# Patient Record
Sex: FEMALE | Race: OTHER | Employment: OTHER | URBAN - METROPOLITAN AREA
[De-identification: names, ages, dates, MRNs, and addresses within clinical notes are randomized per-mention and may not be internally consistent; named-entity substitution may affect disease eponyms.]

---

## 2018-04-26 ENCOUNTER — HOSPITAL ENCOUNTER (OUTPATIENT)
Dept: MAMMOGRAPHY | Age: 63
Discharge: HOME | End: 2018-04-28
Payer: COMMERCIAL

## 2018-04-26 DIAGNOSIS — Z12.31 VISIT FOR SCREENING MAMMOGRAM: ICD-10-CM

## 2018-04-26 PROCEDURE — 77067 SCR MAMMO BI INCL CAD: CPT

## 2018-08-10 ENCOUNTER — HOSPITAL ENCOUNTER (EMERGENCY)
Age: 63
Discharge: HOME | End: 2018-08-10
Payer: COMMERCIAL

## 2018-08-10 VITALS
WEIGHT: 187 LBS | HEIGHT: 60 IN | SYSTOLIC BLOOD PRESSURE: 135 MMHG | TEMPERATURE: 98.6 F | DIASTOLIC BLOOD PRESSURE: 62 MMHG | HEART RATE: 78 BPM | OXYGEN SATURATION: 96 % | RESPIRATION RATE: 16 BRPM | BODY MASS INDEX: 36.71 KG/M2

## 2018-08-10 DIAGNOSIS — M54.31 SCIATICA OF RIGHT SIDE: Primary | ICD-10-CM

## 2018-08-10 PROCEDURE — 6360000002 HC RX W HCPCS: Performed by: PHYSICIAN ASSISTANT

## 2018-08-10 PROCEDURE — 96372 THER/PROPH/DIAG INJ SC/IM: CPT

## 2018-08-10 PROCEDURE — 99283 EMERGENCY DEPT VISIT LOW MDM: CPT

## 2018-08-10 RX ORDER — DEXAMETHASONE SODIUM PHOSPHATE 10 MG/ML
10 INJECTION INTRAMUSCULAR; INTRAVENOUS ONCE
Status: COMPLETED | OUTPATIENT
Start: 2018-08-10 | End: 2018-08-10

## 2018-08-10 RX ORDER — KETOROLAC TROMETHAMINE 30 MG/ML
60 INJECTION, SOLUTION INTRAMUSCULAR; INTRAVENOUS ONCE
Status: COMPLETED | OUTPATIENT
Start: 2018-08-10 | End: 2018-08-10

## 2018-08-10 RX ORDER — PREDNISONE 20 MG/1
60 TABLET ORAL DAILY
Qty: 15 TABLET | Refills: 0 | Status: SHIPPED | OUTPATIENT
Start: 2018-08-10 | End: 2018-08-15

## 2018-08-10 RX ORDER — CYCLOBENZAPRINE HCL 5 MG
5 TABLET ORAL 3 TIMES DAILY PRN
Qty: 15 TABLET | Refills: 0 | Status: SHIPPED | OUTPATIENT
Start: 2018-08-10 | End: 2018-08-20

## 2018-08-10 RX ADMIN — KETOROLAC TROMETHAMINE 60 MG: 30 INJECTION, SOLUTION INTRAMUSCULAR at 11:20

## 2018-08-10 RX ADMIN — DEXAMETHASONE SODIUM PHOSPHATE 10 MG: 10 INJECTION INTRAMUSCULAR; INTRAVENOUS at 11:20

## 2018-08-10 ASSESSMENT — PAIN DESCRIPTION - FREQUENCY: FREQUENCY: CONTINUOUS

## 2018-08-10 ASSESSMENT — PAIN DESCRIPTION - ORIENTATION: ORIENTATION: LOWER

## 2018-08-10 ASSESSMENT — PAIN SCALES - GENERAL
PAINLEVEL_OUTOF10: 10
PAINLEVEL_OUTOF10: 8
PAINLEVEL_OUTOF10: 10

## 2018-08-10 ASSESSMENT — PAIN DESCRIPTION - PAIN TYPE: TYPE: ACUTE PAIN

## 2018-08-10 ASSESSMENT — PAIN DESCRIPTION - PROGRESSION: CLINICAL_PROGRESSION: GRADUALLY WORSENING

## 2018-08-10 ASSESSMENT — PAIN DESCRIPTION - LOCATION: LOCATION: BACK

## 2018-08-16 ENCOUNTER — HOSPITAL ENCOUNTER (EMERGENCY)
Age: 63
Discharge: HOME | End: 2018-08-16
Payer: COMMERCIAL

## 2018-08-16 ENCOUNTER — APPOINTMENT (OUTPATIENT)
Dept: GENERAL RADIOLOGY | Age: 63
End: 2018-08-16
Payer: COMMERCIAL

## 2018-08-16 VITALS
SYSTOLIC BLOOD PRESSURE: 103 MMHG | RESPIRATION RATE: 14 BRPM | BODY MASS INDEX: 37.11 KG/M2 | WEIGHT: 189 LBS | HEIGHT: 60 IN | HEART RATE: 96 BPM | TEMPERATURE: 98.9 F | OXYGEN SATURATION: 94 % | DIASTOLIC BLOOD PRESSURE: 60 MMHG

## 2018-08-16 DIAGNOSIS — M47.26 OSTEOARTHRITIS OF SPINE WITH RADICULOPATHY, LUMBAR REGION: Primary | ICD-10-CM

## 2018-08-16 PROCEDURE — 6360000002 HC RX W HCPCS

## 2018-08-16 PROCEDURE — 6360000002 HC RX W HCPCS: Performed by: PHYSICIAN ASSISTANT

## 2018-08-16 PROCEDURE — 96372 THER/PROPH/DIAG INJ SC/IM: CPT

## 2018-08-16 PROCEDURE — 72100 X-RAY EXAM L-S SPINE 2/3 VWS: CPT

## 2018-08-16 PROCEDURE — 99283 EMERGENCY DEPT VISIT LOW MDM: CPT

## 2018-08-16 RX ORDER — TRAMADOL HYDROCHLORIDE 50 MG/1
50 TABLET ORAL EVERY 6 HOURS PRN
Qty: 20 TABLET | Refills: 0 | Status: SHIPPED | OUTPATIENT
Start: 2018-08-16 | End: 2018-08-21

## 2018-08-16 RX ORDER — MORPHINE SULFATE 4 MG/ML
INJECTION, SOLUTION INTRAMUSCULAR; INTRAVENOUS
Status: COMPLETED
Start: 2018-08-16 | End: 2018-08-16

## 2018-08-16 RX ORDER — DEXAMETHASONE SODIUM PHOSPHATE 10 MG/ML
10 INJECTION INTRAMUSCULAR; INTRAVENOUS ONCE
Status: COMPLETED | OUTPATIENT
Start: 2018-08-16 | End: 2018-08-16

## 2018-08-16 RX ORDER — MORPHINE SULFATE 8 MG/ML
6 INJECTION, SOLUTION INTRAMUSCULAR; INTRAVENOUS ONCE
Status: DISCONTINUED | OUTPATIENT
Start: 2018-08-16 | End: 2018-08-16 | Stop reason: HOSPADM

## 2018-08-16 RX ADMIN — MORPHINE SULFATE 6 MG: 4 INJECTION, SOLUTION INTRAMUSCULAR; INTRAVENOUS at 13:56

## 2018-08-16 RX ADMIN — DEXAMETHASONE SODIUM PHOSPHATE 10 MG: 10 INJECTION INTRAMUSCULAR; INTRAVENOUS at 13:56

## 2018-08-16 ASSESSMENT — PAIN DESCRIPTION - ORIENTATION: ORIENTATION: LOWER

## 2018-08-16 ASSESSMENT — PAIN SCALES - GENERAL: PAINLEVEL_OUTOF10: 10

## 2018-08-16 ASSESSMENT — PAIN DESCRIPTION - LOCATION: LOCATION: BACK

## 2019-08-08 ENCOUNTER — HOSPITAL ENCOUNTER (OUTPATIENT)
Dept: GENERAL RADIOLOGY | Age: 64
Discharge: HOME | End: 2019-08-10
Payer: COMMERCIAL

## 2019-08-08 ENCOUNTER — HOSPITAL ENCOUNTER (OUTPATIENT)
Age: 64
Discharge: HOME | End: 2019-08-10
Payer: COMMERCIAL

## 2019-08-08 DIAGNOSIS — M25.561 RIGHT KNEE PAIN, UNSPECIFIED CHRONICITY: ICD-10-CM

## 2019-08-08 PROCEDURE — 73564 X-RAY EXAM KNEE 4 OR MORE: CPT

## 2022-05-26 ENCOUNTER — HOSPITAL ENCOUNTER (OUTPATIENT)
Dept: PHYSICAL THERAPY | Age: 67
Setting detail: THERAPIES SERIES
Discharge: HOME | End: 2022-05-26
Payer: COMMERCIAL

## 2022-05-26 PROCEDURE — 97161 PT EVAL LOW COMPLEX 20 MIN: CPT | Performed by: PHYSICAL THERAPIST

## 2022-05-26 ASSESSMENT — PAIN DESCRIPTION - LOCATION: LOCATION: BACK

## 2022-05-26 ASSESSMENT — PAIN DESCRIPTION - PAIN TYPE: TYPE: CHRONIC PAIN

## 2022-05-26 ASSESSMENT — PAIN SCALES - GENERAL: PAINLEVEL_OUTOF10: 8

## 2022-05-26 ASSESSMENT — PAIN DESCRIPTION - FREQUENCY: FREQUENCY: CONTINUOUS

## 2022-05-26 ASSESSMENT — PAIN DESCRIPTION - DESCRIPTORS: DESCRIPTORS: CRAMPING;SPASM;SHARP

## 2022-05-26 NOTE — PROGRESS NOTES
Physical Therapy    Physical Therapy: Initial Evaluation    Patient: Deni Browne (49 y.o. female)   Examination Date:   Plan of Care Certification Period: 2022 to        :  1955 ;    Confirmed: Yes MRN: [id number]  CSN: [id number]   Insurance: Payor: Luis A Le / Plan: Vinay Darby / Product Type: *No Product type* /   Insurance ID: [id number] - (Medicaid Managed) Secondary Insurance (if applicable):    Referring Physician: MD Stuart Pastor MD   PCP: Marguerite Cary MD Visits to Date/Visits Approved: 1 /      No Show/Cancelled Appts:   /       Medical Diagnosis: Repeated falls [R29.6]  Unsteadiness on feet [R26.81] R29.6 (ICD-10-CM) - Repeated shtsxY46.81 (ICD-10-CM) - Unsteadiness on feet  Treatment Diagnosis:       PERTINENT MEDICAL HISTORY           Medical History: Chart Reviewed: Yes   Past Medical History:   Diagnosis Date    Asthma     Bipolar 1 disorder ([facility] [address]     Carpal tunnel syndrome     Chronic back pain     Cyst of joint of ankle or foot     L-ankle    Cyst, dermoid, leg     Fibromyalgia     Lumbar disc herniation     Lumbar stenosis     Lung abnormality     spot  on L-lung    Numbness     Osteoarthritis     Stomach ulcer      Surgical History:   Past Surgical History:   Procedure Laterality Date    ANKLE SURGERY      left    CARPAL TUNNEL RELEASE  5010    LT, Dr. Aniket Preston ARTHROSCOPY  12    OTHER SURGICAL HISTORY  2012    face surgery       Medications:   Current Outpatient Medications:     omeprazole (PRILOSEC) 20 MG delayed release capsule, , Disp: , Rfl:     CHANTIX CONTINUING MONTH STEVIE 1 MG tablet, , Disp: , Rfl:     levothyroxine (SYNTHROID) 25 MCG tablet, , Disp: , Rfl:     vitamin D (ERGOCALCIFEROL) 20295 units CAPS capsule, , Disp: , Rfl:     bumetanide (BUMEX) 1 MG tablet, , Disp: , Rfl:     HYDROcodone-acetaminophen (NORCO) 5-325 MG per tablet, , Disp: , Rfl:     zolpidem (AMBIEN) 10 MG tablet, Take 10 mg by mouth nightly as needed for Sleep, Disp: , Rfl:     ALPRAZolam (XANAX) 1 MG tablet, Take 2 mg by mouth 3 times daily as needed, Disp: , Rfl:     QUEtiapine (SEROQUEL) 300 MG tablet, Take 600 mg by mouth nightly, Disp: , Rfl:     gabapentin (NEURONTIN) 800 MG tablet, Take 0.5 tablets by mouth 2 times daily. (Patient taking differently:  Take 800 mg by mouth 3 times daily ), Disp: 90 tablet, Rfl: 3    aspirin 81 MG chewable tablet,  Take 81 mg by mouth daily , Disp: , Rfl:     albuterol (PROVENTIL HFA;VENTOLIN HFA) 108 (90 BASE) MCG/ACT inhaler, Inhale 2 puffs into the lungs every 6 hours as needed. , Disp: , Rfl:   Allergies: Lyrica [pregabalin] and Penicillins      SUBJECTIVE EXAMINATION      ,           Subjective History:    Subjective: Patient presents to PT with c/o repeated falls. Falls have been present for approx 1 year. States symptoms have been progressing. Patient also reports having back pain with radicular symptoms across LEs. She reports buckling of knees occasionally. Dizziness occurs with bending motions. She has generalized fatigue resulting in limited ADL functions per pt. Patient takes norco for pain relief BID, gabapenin TID.   Additional Pertinent Hx (if applicable):            Learning/Language: Learning  Does the patient/guardian have any barriers to learning?: No barriers  What is the preferred language of the patient/guardian?: English     Pain Screening    Pain Screening  Patient Currently in Pain: Yes  Pain Assessment: 0-10  Pain Level: 8  Best Pain Level: 8  Worst Pain Level: 10  Pain Type: Chronic pain  Pain Location: Back  Pain Descriptors: Cramping,Spasm,Sharp  Pain Frequency: Continuous    Functional Status    Dominant Hand: : Left    Social History:    Social History  Lives With: Spouse  Type of Home: Apartment  Home Layout: One level  Home Access: Stairs to enter with rails  Entrance Stairs - Rails: Both  Entrance Stairs - Number of Steps: 4  Bathroom Shower/Tub: Tub/Shower unit,Walk-in shower  Bathroom Toilet: Handicap height  Bathroom Accessibility: Accessible    OBJECTIVE EXAMINATION   Restrictions:     None    Review of Systems:  Follows Commands: Within Functional Limits    Vestibular:   Eye movements: no notable nystagmus  Head/trunk movements: no notable nystagmus     Palpation:   Lumbar Spine Palpation: pain noted across central LS region and throughout R LS region    Ambulation/Gait (if applicable):   Ambulation  Surface: level tile  Device: No Device  Assistance: Independent  Quality of Gait: pt ambulates with wide DIANE; flat foot contact with significant med/lat trunk deviation  Gait Deviations: Slow Evelyn,Increased DIANE,Decreased step length,Decreased step height,Deviated path    Balance Screen:   Balance  Posture: Fair (fwd head/rounded shoulder and trunk posturing)  Sitting - Static: Good  Sitting - Dynamic: Good  Standing - Static: Fair  Standing - Dynamic: Fair    Left AROM  Right AROM       WFL     WFL        Left Strength  Right Strength         Strength LLE  L Hip Flexion: 4+/5  L Hip Extension: 4+/5  L Hip ABduction: 4+/5  L Hip ADduction: 4+/5  L Knee Flexion: 4+/5  L Knee Extension: 4+/5    Strength RLE  R Hip Flexion: 4-/5  R Hip Extension: 4-/5  R Hip ABduction: 4-/5  R Hip ADduction: 4-/5  R Knee Flexion: 4+/5  R Knee Extension: 4+/5     Cervical Assessment     AROM Cervical Spine   Cervical Spine AROM : WFL         Lumbar Assessment     AROM Lumbar Spine   Lumbar spine general AROM: limited 50% all planes       Trunk Strength     Trunk Strength  Trunk Flexion: 4-/5  Trunk Extension: 4-/5     Special Tests:        Balance/Gait Assessment(s) Performed:   Tinetti Balance    Sitting Balance: Steady, safe  Arises: Able without using arms  Attempts to Arise: Able to arise, one attempt  Immediate Standing Balance (First 5 Seconds):  Unsteady (swaggers, moves feet, trunk sway)  Standing Balance: Steady but wide stance, uses cane or other support  Nudged: Staggers, grabs, catches self  Eyes Closed: Unsteady  Turned 360 Degrees: Steadiness: Steady  Turned 360 Degrees: Continuity of Steps: Continuous  Sitting Down: Safe, smooth motion  Balance Score: 11/16 Initiation of Gait: No hesitancy  Step Height: R Swing Foot: Right foot complete clears floor  Step Length: R Swing Foot: Passes left stance foot  Step Height: L Swing Foot: Left foot complete clears floor  Step Length: L Swing Foot: Passes right stance foot  Step Symmetry: Right and left step appear equal  Step Continuity: Steps appear continuous  Path: Mild/moderate deviation or uses walking aid  Trunk: Marked sway or uses walking aid  Walking Time: Heels apart  Gait Score: 8/12     Current Score: 19 / 28 (Date: 5/26/2022)    Interpretation of Score: Tinetti is  into a gait score and balance score. The higher the patient's score, the more independent/lower fall risk. A total score of 27 or more indicates low fall risk, 20-26 is moderate fall risk, and 19 or less is indicative of high fall risk. ASSESSMENT     Impression: Assessment: Patient presents to PT with reports of frequent falls; Limited balance noted at 19/28 Tinetti (high risk of falls); Pt has increased back pain with radicular symptoms across BLEs; Limited strength/ROM across trunk with hindered posturing    Body Structures, Functions, Activity Limitations Requiring Skilled Therapeutic Intervention: Decreased functional mobility ,Decreased ROM,Decreased strength,Decreased balance,Decreased posture    Statement of Medical Necessity: Physical Therapy is both indicated and medically necessary as outlined in the POC to increase the likelihood of meeting the functionally related goals stated below.      Patient's Activity Tolerance:        Patient's rehabilitation potential/prognosis is considered to be: Fair,Good    Factors which may impact rehabilitation potential include:          GOALS   Patient Goal(s):    Short Term Goals Completed by 3 weeks Goal Status   increase AROM of trunk to within 75% functional limits all planes     increase strength of trunk/R hip to grossly 4/5 improving upright posture to FAIR+     increase balance to > 20/28 Tinetti improving functional gait/pt safety     decrease pain to < 5/10 across back/BLEs with activity               Long Term Goals Completed by 6 weeks Goal Status   increase AROM of trunk to within 90% functional limits all planes     increase strength of trunk/R hip to grossly 4+/5 improving upright posture to GOOD-     increase balance to > 22/28 Tinetti improving functional gait/pt safety     decrease pain to < 3/10 across back/BLEs with activity     pt demonstrates independence with HEP          TREATMENT PLAN       Requires PT Follow-Up: Yes    Pt. actively involved in establishing Plan of Care and Goals: Yes  Patient/ Caregiver education and instruction:      Pt instructed to use FWW at home especially when feeling dizzy, unsteady, or when having generalized fatigue- pt and pt  verbalized understanding         Treatment may include any combination of the following: Strengthening,ROM,Balance training,Gait training,Neuromuscular re-education,Home exercise program,Safety education & training,Patient/Caregiver education & training,Equipment evaluation, education, & procurement,Vestibular rehab     Frequency / Duration:  Patient to be seen 2 for 6 weeks      Eval Complexity:    Decision Making: Low Complexity    PT Treatment Completed:  N/A - Evaluation Only    Therapy Time  Individual Time In: 1335       Individual Time Out: 19643 68 71 79  Minutes: 40        Therapist Signature: Itzel Faust, PT    Date: 1/31/8842     I certify that the above Therapy Services are being furnished while the patient is under my care. I agree with the treatment plan and certify that this therapy is necessary.       [de-identified] Signature:  ___________________________   Date:_______ Kirsten Nice MD        Physician Comments: _______________________________________________    Please sign and return to [facility]. Please fax to the location listed below.  Juan Grijalva for this referral!    [address] [facility]  [address] [id number]  Dept: [address]: [phone]       POC NOTE

## 2022-06-28 ENCOUNTER — HOSPITAL ENCOUNTER (OUTPATIENT)
Dept: PHYSICAL THERAPY | Age: 67
Setting detail: THERAPIES SERIES
End: 2022-06-28
Payer: COMMERCIAL

## 2022-06-28 NOTE — PROGRESS NOTES
[facility]  Phone: [phone] Fax: [phone]     Physical Therapy  Cancellation/No-show Note  Patient Name:  Sarika Austin  :  1955   Date:  2022    For today's appointment patient:  [x]  Cancelled  []  Rescheduled appointment  []  No-show     Reason given by patient:  []  Patient ill  [x]  Conflicting appointment  []  No transportation    []  Conflict with work  []  No reason given  []  Other:     Comments:      Electronically signed by:  Jaison Alberto PT

## 2022-06-30 ENCOUNTER — HOSPITAL ENCOUNTER (OUTPATIENT)
Dept: PHYSICAL THERAPY | Age: 67
Setting detail: THERAPIES SERIES
Discharge: HOME | End: 2022-06-30
Payer: COMMERCIAL

## 2022-06-30 PROCEDURE — 97110 THERAPEUTIC EXERCISES: CPT | Performed by: PHYSICAL THERAPIST

## 2022-06-30 PROCEDURE — 97530 THERAPEUTIC ACTIVITIES: CPT | Performed by: PHYSICAL THERAPIST

## 2022-06-30 NOTE — PROGRESS NOTES
[facility]  Phone: [phone] Fax: [phone]       Physical Therapy Daily Treatment Note  Date:  2022    Patient Name:  Mao Ravi    :  1955  MRN: [id number]    Patient: Mao Ravi (56 y.o. female)   Examination Date:   Plan of Care Certification Period: 2022 to       :  1955 ;    Confirmed: Yes MRN: [id number]  CSN: [id number]   Insurance: Payor: OrderBorderml Asp / Plan: Marradhaa Silke / Product Type: *No Product type* /   Insurance ID: [id number] - (Medicaid Managed) Secondary Insurance (if applicable):    Referring Physician: MD Annmarie De La Fuente MD   PCP: Eve Monreal MD Visits to Date/Visits Approved: 2/      No Show/Cancelled Appts:   /       Medical Diagnosis: Repeated falls [R29.6]  Unsteadiness on feet [R26.81] R29.6 (ICD-10-CM) - Repeated [id number] (ICD-10-CM) - Unsteadiness on feet  Treatment Diagnosis:         Time In: 1035       Time Out:     1130     Subjective:  Pt reports cont to have back tightness and difficulty with stairs     Exercises:  Exercise/Equipment Resistance/Repetitions Other comments   tball flex           rot 10x10s  10x10s ea               Sit to stand    3x10           Step ups   3x10ea 6in           Marching    3x10ea            Side ambulation    3x15ft ea             Bike     [id number]                                                               Other Therapeutic Activities:      Home Exercise Program:      Manual Treatments:      Modalities:      Timed Code Treatment Minutes: Total Treatment Minutes:      Treatment/Activity Tolerance:  [x] Patient tolerated treatment well [] Patient limited by fatigue  [] Patient limited by pain  [] Patient limited by other medical complications  [] Other: good tolerance to exercise. Mild fatigue reported however no LOB/falls with exercises.      Plan:   [x] Continue per plan of care [] Alter current plan (see comments)  [] Plan of care initiated []

## 2022-07-14 ENCOUNTER — HOSPITAL ENCOUNTER (OUTPATIENT)
Dept: PHYSICAL THERAPY | Age: 67
Setting detail: THERAPIES SERIES
Discharge: HOME | End: 2022-07-14
Payer: COMMERCIAL

## 2022-07-14 PROCEDURE — 97110 THERAPEUTIC EXERCISES: CPT | Performed by: PHYSICAL THERAPIST

## 2022-07-14 PROCEDURE — 97530 THERAPEUTIC ACTIVITIES: CPT | Performed by: PHYSICAL THERAPIST

## 2022-07-14 NOTE — PROGRESS NOTES
Continue per plan of care [] Alter current plan (see comments)  [] Plan of care initiated [] Hold pending MD visit [] Discharge  Plan for Next Session:         Treatment Charges: Mins Units   Initial Evaluation     Re-Evaluation     Ther Exercise         TE 30 2   Manual Therapy     MT     Ther Activities        TA 15 1   Gait Training          GT     Neuro Re-education NR     Modalities     Non-Billable Service Time     Other     Total Time/Units 45 3     Electronically signed by:  Jasmine Hubbard PT

## 2022-07-28 ENCOUNTER — HOSPITAL ENCOUNTER (OUTPATIENT)
Dept: PHYSICAL THERAPY | Age: 67
Setting detail: THERAPIES SERIES
Discharge: HOME | End: 2022-07-28
Payer: COMMERCIAL

## 2022-07-28 NOTE — PROGRESS NOTES
[facility]  Phone: [phone] Fax: [phone]     Physical Therapy  Cancellation/No-show Note  Patient Name:  Wilman Jarvis  :  1955   Date:  2022    For today's appointment patient:  [x]  Cancelled  []  Rescheduled appointment  []  No-show     Reason given by patient:  [x]  Patient ill  []  Conflicting appointment  []  No transportation    []  Conflict with work  []  No reason given  []  Other:     Comments:      Electronically signed by:  Aditi Manrique PT

## 2022-08-04 ENCOUNTER — HOSPITAL ENCOUNTER (OUTPATIENT)
Dept: AUDIOLOGY | Age: 67
Discharge: HOME | End: 2022-08-04

## 2022-08-04 PROCEDURE — 9990000010 HC NO CHARGE VISIT: Performed by: AUDIOLOGIST

## 2022-08-04 NOTE — PROGRESS NOTES
HEARING AID EVALUATION    Patient presents with audiometric testing for hearing aid evaluation/selection. After consultation, the patient would like to try Phonak Calebeo LR, black, medium power, 1 or 2 length, medium vented domes. The patient has [employer]. Medical necessity was obtained from Dr. Dilia Velarde and information will be submitted to insurance. Pending approval, hearing aids will be ordered. The patient wasnot scheduled for a hearing aid fitting at this time.     Electronically signed by Abdelrahman Burton on 8/4/2022 at 2:37 PM

## 2022-09-15 ENCOUNTER — HOSPITAL ENCOUNTER (OUTPATIENT)
Dept: AUDIOLOGY | Age: 67
Discharge: HOME | End: 2022-09-15

## 2022-09-15 PROCEDURE — 9990000010 HC NO CHARGE VISIT: Performed by: AUDIOLOGIST

## 2022-09-15 NOTE — PROGRESS NOTES
Fit with binaural  Phonak [id number] RITE /BTEhearing aids. Instructed in use and care. Gave  battery charger, warranty information and scheduled  30 day check for 10/11/22. Made following adjustments: none. Set to 90%, with automatic manager for 21 days. Hearing aid contract/battery warning form reviewed and signed. Hearing aids paired to phone davi per patient preference. She may opt to try at next visit. Patient was satisfied and will follow up on above date, unless problems arise. No charge visit today. Will bill at 30 day follow up per [facility] guidelines.      Electronically signed by Mckay Banegas on 9/15/2022 at 3:23 PM

## 2022-10-11 ENCOUNTER — HOSPITAL ENCOUNTER (OUTPATIENT)
Dept: AUDIOLOGY | Age: 67
Discharge: HOME | End: 2022-10-11
Payer: COMMERCIAL

## 2022-10-11 PROCEDURE — V5261 HEARING AID, DIGIT, BIN, BTE: HCPCS | Performed by: AUDIOLOGIST

## 2022-10-11 PROCEDURE — V5160 DISPENSING FEE BINAURAL: HCPCS | Performed by: AUDIOLOGIST

## 2022-10-11 NOTE — PROGRESS NOTES
HEARING AID FOLLOW UP       Patient hearing well; however she has several complaints. Hearing aid battery goes low after 6-7 hours and she has to put back in . HA glows yellow (battery anywhere from 11% to 80% charge)  Hears whistling when granddaughter has iPad nearby. Happened 1 time. Hearing is muffled after taking hearing aids out. Some sounds are too loud. Adjustments made:    Downloaded and paired davi (not streaming). She can check battery drain when she feels battery has gone dead and track to see if battery really is draining. Will monitor for any other unwanted noises. Suggest checking that hearing aid is fully inserted so not getting feedback. Explained may have perception of hearing loss after removing hearing aids. This is normal.   Hearing aid auto acclimatization turned off and set from 97% (auto since fitting) back to 90%. Explained how to use davi to adjust frequency response and use programs for background noise. Made a follow up appointment for 2 weeks. Billing done. BILLED BIN DIG BTE AND DISP FEE.      Sharry Boeck, M.A., [address] [facility] [id number]  Electronically signed by Bryon Hernandez on 10/11/2022 at 12:19 PM

## 2022-10-17 ENCOUNTER — FOLLOWUP TELEPHONE ENCOUNTER (OUTPATIENT)
Dept: AUDIOLOGY | Age: 67
End: 2022-10-17

## 2022-10-17 NOTE — TELEPHONE ENCOUNTER
Patient called in stating her left aid will not turn on: it does not flash any lights when in . Asked her to try to reset over phone, but it did not work. Asked her to bring in hearing and  on 10/13/22. Right aid charges in the left slot, no error codes noted. Reset hearing aid and hearing aid appears to be now working. Demonstrated to patient and  that hearing aid is now charging appropriately. Sounds good to her. Return as needed.      Ne Acosta M.A., [address] [facility] [id number]  Electronically signed by nAgela Bartlett on 10/17/2022 at 8:15 AM

## 2024-01-16 ENCOUNTER — FOLLOWUP TELEPHONE ENCOUNTER (OUTPATIENT)
Dept: AUDIOLOGY | Age: 69
End: 2024-01-16

## 2024-01-16 NOTE — TELEPHONE ENCOUNTER
Received message at [facility] stating she lost hearing aid and wants to know if she can get ITE. She now has [facility]. (Her old hearing aid is under warranty. Cannot get ITE, but can get custom mold).     Called back and left message. When she calls back, can explain above.     Electronically signed by Rhoda Young on 1/16/2024 at 10:06 AM

## 2025-01-14 ENCOUNTER — HOSPITAL ENCOUNTER (OUTPATIENT)
Age: 70
Discharge: HOME | End: 2025-01-14
Payer: MEDICARE

## 2025-01-14 LAB
ANION GAP SERPL CALCULATED.3IONS-SCNC: 11 MMOL/L (ref 7–16)
BASOPHILS # BLD: 0.04 K/UL (ref 0–0.2)
BASOPHILS NFR BLD: 1 % (ref 0–2)
BUN SERPL-MCNC: 16 MG/DL (ref 6–23)
CALCIUM SERPL-MCNC: 9.2 MG/DL (ref 8.6–10.2)
CHLORIDE SERPL-SCNC: 104 MMOL/L (ref 98–107)
CO2 SERPL-SCNC: 27 MMOL/L (ref 22–29)
CREAT SERPL-MCNC: 1 MG/DL (ref 0.5–1)
EOSINOPHIL # BLD: 0.1 K/UL (ref 0.05–0.5)
EOSINOPHILS RELATIVE PERCENT: 2 % (ref 0–6)
ERYTHROCYTE [DISTWIDTH] IN BLOOD BY AUTOMATED COUNT: 12.2 % (ref 11.5–15)
GFR, ESTIMATED: 62 ML/MIN/1.73M2
GLUCOSE SERPL-MCNC: 105 MG/DL (ref 74–99)
HBA1C MFR BLD: 5.5 % (ref 4–5.6)
HCT VFR BLD AUTO: 44 % (ref 34–48)
HGB BLD-MCNC: 13.8 G/DL (ref 11.5–15.5)
IMM GRANULOCYTES # BLD AUTO: <0.03 K/UL (ref 0–0.58)
IMM GRANULOCYTES NFR BLD: 0 % (ref 0–5)
INR PPP: 1.1
LYMPHOCYTES NFR BLD: 0.97 K/UL (ref 1.5–4)
LYMPHOCYTES RELATIVE PERCENT: 20 % (ref 20–42)
MCH RBC QN AUTO: 31.4 PG (ref 26–35)
MCHC RBC AUTO-ENTMCNC: 31.4 G/DL (ref 32–34.5)
MCV RBC AUTO: 100.2 FL (ref 80–99.9)
MONOCYTES NFR BLD: 0.55 K/UL (ref 0.1–0.95)
MONOCYTES NFR BLD: 11 % (ref 2–12)
NEUTROPHILS NFR BLD: 65 % (ref 43–80)
NEUTS SEG NFR BLD: 3.15 K/UL (ref 1.8–7.3)
PLATELET # BLD AUTO: 236 K/UL (ref 130–450)
PMV BLD AUTO: 10.6 FL (ref 7–12)
POTASSIUM SERPL-SCNC: 4.3 MMOL/L (ref 3.5–5)
PROTHROMBIN TIME: 11.6 SEC (ref 9.3–12.4)
RBC # BLD AUTO: 4.39 M/UL (ref 3.5–5.5)
SODIUM SERPL-SCNC: 142 MMOL/L (ref 132–146)
WBC OTHER # BLD: 4.8 K/UL (ref 4.5–11.5)

## 2025-01-14 PROCEDURE — 80048 BASIC METABOLIC PNL TOTAL CA: CPT

## 2025-01-14 PROCEDURE — 85610 PROTHROMBIN TIME: CPT

## 2025-01-14 PROCEDURE — 36415 COLL VENOUS BLD VENIPUNCTURE: CPT

## 2025-01-14 PROCEDURE — 83036 HEMOGLOBIN GLYCOSYLATED A1C: CPT

## 2025-01-14 PROCEDURE — 85025 COMPLETE CBC W/AUTO DIFF WBC: CPT

## 2025-02-05 ENCOUNTER — HOSPITAL ENCOUNTER (OUTPATIENT)
Age: 70
Discharge: HOME | End: 2025-02-07

## 2025-02-05 LAB
ABO + RH BLD: NORMAL
ARM BAND NUMBER: NORMAL
BLOOD BANK SAMPLE EXPIRATION: NORMAL
BLOOD GROUP ANTIBODIES SERPL: NEGATIVE

## 2025-02-05 PROCEDURE — 87081 CULTURE SCREEN ONLY: CPT

## 2025-02-05 PROCEDURE — 86900 BLOOD TYPING SEROLOGIC ABO: CPT

## 2025-02-05 PROCEDURE — 86850 RBC ANTIBODY SCREEN: CPT

## 2025-02-05 PROCEDURE — 86901 BLOOD TYPING SEROLOGIC RH(D): CPT

## 2025-02-07 LAB
MICROORGANISM SPEC CULT: NORMAL
SPECIMEN DESCRIPTION: NORMAL

## 2025-02-12 ENCOUNTER — HOSPITAL ENCOUNTER (OUTPATIENT)
Age: 70
Discharge: HOME | End: 2025-02-14

## 2025-02-12 LAB
ANION GAP SERPL CALCULATED.3IONS-SCNC: 11 MMOL/L (ref 7–16)
BUN SERPL-MCNC: 15 MG/DL (ref 6–23)
CALCIUM SERPL-MCNC: 9.1 MG/DL (ref 8.6–10.2)
CHLORIDE SERPL-SCNC: 101 MMOL/L (ref 98–107)
CO2 SERPL-SCNC: 28 MMOL/L (ref 22–29)
CREAT SERPL-MCNC: 0.8 MG/DL (ref 0.5–1)
ERYTHROCYTE [DISTWIDTH] IN BLOOD BY AUTOMATED COUNT: 12.4 % (ref 11.5–15)
GFR, ESTIMATED: 76 ML/MIN/1.73M2
GLUCOSE SERPL-MCNC: 125 MG/DL (ref 74–99)
HCT VFR BLD AUTO: 41.1 % (ref 34–48)
HGB BLD-MCNC: 12.5 G/DL (ref 11.5–15.5)
MCH RBC QN AUTO: 30.5 PG (ref 26–35)
MCHC RBC AUTO-ENTMCNC: 30.4 G/DL (ref 32–34.5)
MCV RBC AUTO: 100.2 FL (ref 80–99.9)
PLATELET # BLD AUTO: 255 K/UL (ref 130–450)
PMV BLD AUTO: 11 FL (ref 7–12)
POTASSIUM SERPL-SCNC: 4.4 MMOL/L (ref 3.5–5)
RBC # BLD AUTO: 4.1 M/UL (ref 3.5–5.5)
SODIUM SERPL-SCNC: 140 MMOL/L (ref 132–146)
WBC OTHER # BLD: 10.6 K/UL (ref 4.5–11.5)

## 2025-02-12 PROCEDURE — 80048 BASIC METABOLIC PNL TOTAL CA: CPT

## 2025-02-12 PROCEDURE — 85027 COMPLETE CBC AUTOMATED: CPT
